# Patient Record
Sex: FEMALE | Race: WHITE | HISPANIC OR LATINO
[De-identification: names, ages, dates, MRNs, and addresses within clinical notes are randomized per-mention and may not be internally consistent; named-entity substitution may affect disease eponyms.]

---

## 2018-06-16 ENCOUNTER — TRANSCRIPTION ENCOUNTER (OUTPATIENT)
Age: 23
End: 2018-06-16

## 2019-01-03 ENCOUNTER — TRANSCRIPTION ENCOUNTER (OUTPATIENT)
Age: 24
End: 2019-01-03

## 2019-02-19 ENCOUNTER — TRANSCRIPTION ENCOUNTER (OUTPATIENT)
Age: 24
End: 2019-02-19

## 2019-08-06 PROBLEM — Z00.00 ENCOUNTER FOR PREVENTIVE HEALTH EXAMINATION: Status: ACTIVE | Noted: 2019-08-06

## 2019-08-19 ENCOUNTER — TRANSCRIPTION ENCOUNTER (OUTPATIENT)
Age: 24
End: 2019-08-19

## 2019-08-27 ENCOUNTER — APPOINTMENT (OUTPATIENT)
Dept: MRI IMAGING | Facility: CLINIC | Age: 24
End: 2019-08-27

## 2019-09-14 ENCOUNTER — APPOINTMENT (OUTPATIENT)
Dept: MRI IMAGING | Facility: CLINIC | Age: 24
End: 2019-09-14

## 2020-06-09 ENCOUNTER — TRANSCRIPTION ENCOUNTER (OUTPATIENT)
Age: 25
End: 2020-06-09

## 2021-01-03 ENCOUNTER — TRANSCRIPTION ENCOUNTER (OUTPATIENT)
Age: 26
End: 2021-01-03

## 2021-01-29 ENCOUNTER — TRANSCRIPTION ENCOUNTER (OUTPATIENT)
Age: 26
End: 2021-01-29

## 2021-10-30 ENCOUNTER — APPOINTMENT (OUTPATIENT)
Dept: DISASTER EMERGENCY | Facility: CLINIC | Age: 26
End: 2021-10-30

## 2021-10-30 DIAGNOSIS — Z01.818 ENCOUNTER FOR OTHER PREPROCEDURAL EXAMINATION: ICD-10-CM

## 2021-10-31 LAB — SARS-COV-2 N GENE NPH QL NAA+PROBE: NOT DETECTED

## 2021-11-02 ENCOUNTER — OUTPATIENT (OUTPATIENT)
Dept: OUTPATIENT SERVICES | Facility: HOSPITAL | Age: 26
LOS: 1 days | End: 2021-11-02

## 2021-12-08 ENCOUNTER — OUTPATIENT (OUTPATIENT)
Dept: OUTPATIENT SERVICES | Facility: HOSPITAL | Age: 26
LOS: 1 days | End: 2021-12-08

## 2021-12-16 ENCOUNTER — TRANSCRIPTION ENCOUNTER (OUTPATIENT)
Age: 26
End: 2021-12-16

## 2021-12-22 ENCOUNTER — OUTPATIENT (OUTPATIENT)
Dept: OUTPATIENT SERVICES | Facility: HOSPITAL | Age: 26
LOS: 1 days | End: 2021-12-22

## 2021-12-29 ENCOUNTER — EMERGENCY (EMERGENCY)
Facility: HOSPITAL | Age: 26
LOS: 1 days | End: 2021-12-29
Admitting: EMERGENCY MEDICINE
Payer: MEDICAID

## 2021-12-29 PROCEDURE — 99284 EMERGENCY DEPT VISIT MOD MDM: CPT

## 2022-08-25 ENCOUNTER — OUTPATIENT (OUTPATIENT)
Dept: OUTPATIENT SERVICES | Facility: HOSPITAL | Age: 27
LOS: 1 days | End: 2022-08-25

## 2022-08-25 DIAGNOSIS — N83.209 UNSPECIFIED OVARIAN CYST, UNSPECIFIED SIDE: ICD-10-CM

## 2022-08-25 DIAGNOSIS — E66.9 OBESITY, UNSPECIFIED: ICD-10-CM

## 2022-08-25 DIAGNOSIS — Z01.419 ENCOUNTER FOR GYNECOLOGICAL EXAMINATION (GENERAL) (ROUTINE) WITHOUT ABNORMAL FINDINGS: ICD-10-CM

## 2022-08-25 DIAGNOSIS — A69.20 LYME DISEASE, UNSPECIFIED: ICD-10-CM

## 2022-08-25 DIAGNOSIS — E61.1 IRON DEFICIENCY: ICD-10-CM

## 2022-08-25 DIAGNOSIS — D51.3 OTHER DIETARY VITAMIN B12 DEFICIENCY ANEMIA: ICD-10-CM

## 2022-08-25 DIAGNOSIS — R10.2 PELVIC AND PERINEAL PAIN: ICD-10-CM

## 2022-08-25 DIAGNOSIS — E55.9 VITAMIN D DEFICIENCY, UNSPECIFIED: ICD-10-CM

## 2022-08-25 DIAGNOSIS — N84.1 POLYP OF CERVIX UTERI: ICD-10-CM

## 2022-09-05 ENCOUNTER — NON-APPOINTMENT (OUTPATIENT)
Age: 27
End: 2022-09-05

## 2022-09-12 ENCOUNTER — NON-APPOINTMENT (OUTPATIENT)
Age: 27
End: 2022-09-12

## 2022-10-26 ENCOUNTER — TRANSCRIPTION ENCOUNTER (OUTPATIENT)
Age: 27
End: 2022-10-26

## 2023-03-31 ENCOUNTER — APPOINTMENT (OUTPATIENT)
Dept: ORTHOPEDIC SURGERY | Facility: CLINIC | Age: 28
End: 2023-03-31
Payer: COMMERCIAL

## 2023-03-31 DIAGNOSIS — M79.671 PAIN IN RIGHT FOOT: ICD-10-CM

## 2023-03-31 DIAGNOSIS — M21.42 FLAT FOOT [PES PLANUS] (ACQUIRED), LEFT FOOT: ICD-10-CM

## 2023-03-31 DIAGNOSIS — M77.42 METATARSALGIA, RIGHT FOOT: ICD-10-CM

## 2023-03-31 DIAGNOSIS — M77.41 METATARSALGIA, RIGHT FOOT: ICD-10-CM

## 2023-03-31 DIAGNOSIS — Z78.9 OTHER SPECIFIED HEALTH STATUS: ICD-10-CM

## 2023-03-31 DIAGNOSIS — M79.672 PAIN IN RIGHT FOOT: ICD-10-CM

## 2023-03-31 DIAGNOSIS — M21.41 FLAT FOOT [PES PLANUS] (ACQUIRED), RIGHT FOOT: ICD-10-CM

## 2023-03-31 PROCEDURE — 99203 OFFICE O/P NEW LOW 30 MIN: CPT

## 2023-03-31 NOTE — PHYSICAL EXAM
[Bilateral] : foot and ankle bilaterally [4th] : 4th [5th] : 5th [3rd] : 3rd [NL 30)] : inversion 30 degrees [NL (40)] : MTP joint DF 40 degrees [NL (20)] : MTP joint PF 20 degrees [5___] : [facility] 5[unfilled]/5 [2+] : posterior tibialis pulse: 2+ [Normal] : saphenous nerve sensation normal [] : varicosities are not warm and well perfused [FreeTextEntry3] : Equinus bilateral of Achilles

## 2023-03-31 NOTE — ASSESSMENT
[FreeTextEntry1] : CASTED FOR ORTHOTICS BILATERAL, SHOES SIZE 7, [employer], WEIGHT 160 LB, ACTIVITIES-WORK.\par 27 YEARS OLD, HEIGHT 5' \par OTC INSERTS -PUPERFEET, POWERSTEP. \par CASTED FOR ORTHOTICS. \par ORTHOTICS ARE NEEDED TO REDUCE PRONATION AND EQUINUS OF FEET.

## 2023-03-31 NOTE — REASON FOR VISIT
[FreeTextEntry2] : Patient is here for bilateral feet pain. Patient pain is for the past 5 years possibly due to flat feet.

## 2023-05-12 ENCOUNTER — APPOINTMENT (OUTPATIENT)
Dept: ORTHOPEDIC SURGERY | Facility: CLINIC | Age: 28
End: 2023-05-12

## 2023-09-25 ENCOUNTER — NON-APPOINTMENT (OUTPATIENT)
Age: 28
End: 2023-09-25

## 2024-02-05 ENCOUNTER — OFFICE (OUTPATIENT)
Dept: URBAN - METROPOLITAN AREA CLINIC 97 | Facility: CLINIC | Age: 29
Setting detail: OPHTHALMOLOGY
End: 2024-02-05
Payer: COMMERCIAL

## 2024-02-05 VITALS — HEIGHT: 55 IN

## 2024-02-05 DIAGNOSIS — G24.5: ICD-10-CM

## 2024-02-05 DIAGNOSIS — H16.223: ICD-10-CM

## 2024-02-05 PROCEDURE — 92004 COMPRE OPH EXAM NEW PT 1/>: CPT | Performed by: OPHTHALMOLOGY

## 2024-02-05 ASSESSMENT — REFRACTION_AUTOREFRACTION
OS_AXIS: 074
OD_AXIS: 084
OD_SPHERE: -4.50
OD_CYLINDER: +1.00
OS_SPHERE: -4.25
OS_CYLINDER: +0.75

## 2024-02-05 ASSESSMENT — REFRACTION_CURRENTRX
OD_CYLINDER: +0.50
OS_AXIS: 085
OD_VPRISM_DIRECTION: SV
OD_AXIS: 100
OS_VPRISM_DIRECTION: SV
OS_SPHERE: -4.25
OS_OVR_VA: 20/
OD_SPHERE: -4.25
OD_OVR_VA: 20/
OS_CYLINDER: +0.50

## 2024-02-05 ASSESSMENT — SPHEQUIV_DERIVED
OS_SPHEQUIV: -4.25
OD_SPHEQUIV: -4
OD_SPHEQUIV: -4.25
OS_SPHEQUIV: -3.875
OD_SPHEQUIV: -4.25
OS_SPHEQUIV: -4.25

## 2024-02-05 ASSESSMENT — CONFRONTATIONAL VISUAL FIELD TEST (CVF)
OD_FINDINGS: FULL
OS_FINDINGS: FULL

## 2024-02-05 ASSESSMENT — REFRACTION_MANIFEST
OU_VA: 20/20
OU_VA: 20/20
OD_CYLINDER: +0.50
OD_VA1: 20/20
OD_SPHERE: -4.50
OS_CYLINDER: +0.50
OD_AXIS: 085
OD_AXIS: 085
OD_CYLINDER: +0.50
OS_SPHERE: -4.50
OS_VA1: 20/20
OD_SPHERE: -4.50
OS_CYLINDER: +0.50
OS_VA1: 20/20
OS_AXIS: 075
OS_AXIS: 075
OD_VA1: 20/20
OS_SPHERE: -4.50

## 2024-02-05 ASSESSMENT — SUPERFICIAL PUNCTATE KERATITIS (SPK)
OS_SPK: T
OD_SPK: T

## 2024-04-06 ENCOUNTER — NON-APPOINTMENT (OUTPATIENT)
Age: 29
End: 2024-04-06

## 2024-10-02 ENCOUNTER — NON-APPOINTMENT (OUTPATIENT)
Age: 29
End: 2024-10-02

## 2024-10-12 ENCOUNTER — OFFICE (OUTPATIENT)
Dept: URBAN - METROPOLITAN AREA CLINIC 97 | Facility: CLINIC | Age: 29
Setting detail: OPHTHALMOLOGY
End: 2024-10-12
Payer: COMMERCIAL

## 2024-10-12 DIAGNOSIS — H52.13: ICD-10-CM

## 2024-10-12 DIAGNOSIS — H16.223: ICD-10-CM

## 2024-10-12 PROBLEM — H52.7 REFRACTIVE ERROR: Status: ACTIVE | Noted: 2024-10-12

## 2024-10-12 PROCEDURE — 92015 DETERMINE REFRACTIVE STATE: CPT

## 2024-10-12 PROCEDURE — 99213 OFFICE O/P EST LOW 20 MIN: CPT

## 2024-10-12 ASSESSMENT — REFRACTION_AUTOREFRACTION
OD_AXIS: 089
OD_CYLINDER: +1.50
OD_SPHERE: -4.75
OS_CYLINDER: +1.00
OS_SPHERE: -4.50
OS_AXIS: 076

## 2024-10-12 ASSESSMENT — KERATOMETRY
OD_AXISANGLE_DEGREES: 082
OS_K2POWER_DIOPTERS: 41.75
OS_K1POWER_DIOPTERS: 41.00
OD_K2POWER_DIOPTERS: 42.25
OS_AXISANGLE_DEGREES: 084
OD_K1POWER_DIOPTERS: 40.50
METHOD_AUTO_MANUAL: AUTO

## 2024-10-12 ASSESSMENT — REFRACTION_MANIFEST
OS_VA1: 20/20-
OD_VA1: 20/20
OU_VA: 20/20
OD_VA1: 20/20
OS_SPHERE: -4.50
OS_CYLINDER: +0.50
OS_SPHERE: -4.25
OD_AXIS: 085
OD_SPHERE: -4.75
OS_CYLINDER: +0.50
OS_AXIS: 075
OS_AXIS: 075
OD_CYLINDER: +0.50
OD_CYLINDER: +0.50
OS_VA1: 20/20-
OU_VA: 20/20
OD_SPHERE: -4.25
OD_AXIS: 090

## 2024-10-12 ASSESSMENT — REFRACTION_CURRENTRX
OD_SPHERE: -4.25
OS_AXIS: 074
OD_AXIS: 087
OD_SPHERE: -4.25
OS_VPRISM_DIRECTION: SV
OD_VPRISM_DIRECTION: SV
OS_OVR_VA: 20/
OS_CYLINDER: +0.50
OS_CYLINDER: +0.50
OS_SPHERE: -4.25
OS_AXIS: 085
OS_OVR_VA: 20/
OS_SPHERE: -4.25
OD_VPRISM_DIRECTION: SV
OD_CYLINDER: +0.50
OD_CYLINDER: +0.50
OD_AXIS: 100
OD_OVR_VA: 20/
OS_VPRISM_DIRECTION: SV
OD_OVR_VA: 20/

## 2024-10-12 ASSESSMENT — TONOMETRY
OD_IOP_MMHG: 15
OS_IOP_MMHG: 15

## 2024-10-12 ASSESSMENT — PACHYMETRY
OS_CT_UM: 550
OD_CT_UM: 555
OS_CT_CORRECTION: -1
OD_CT_CORRECTION: -1

## 2024-10-12 ASSESSMENT — VISUAL ACUITY
OD_BCVA: 20/25-
OS_BCVA: 20/25

## 2024-10-12 ASSESSMENT — CONFRONTATIONAL VISUAL FIELD TEST (CVF)
OS_FINDINGS: FULL
OD_FINDINGS: FULL

## 2024-10-12 ASSESSMENT — SUPERFICIAL PUNCTATE KERATITIS (SPK)
OS_SPK: T
OD_SPK: T

## 2024-12-15 ENCOUNTER — NON-APPOINTMENT (OUTPATIENT)
Age: 29
End: 2024-12-15

## 2025-01-17 ENCOUNTER — NON-APPOINTMENT (OUTPATIENT)
Age: 30
End: 2025-01-17

## 2025-06-11 ENCOUNTER — NON-APPOINTMENT (OUTPATIENT)
Age: 30
End: 2025-06-11